# Patient Record
Sex: MALE | Race: OTHER
[De-identification: names, ages, dates, MRNs, and addresses within clinical notes are randomized per-mention and may not be internally consistent; named-entity substitution may affect disease eponyms.]

---

## 2019-01-07 ENCOUNTER — HOSPITAL ENCOUNTER (EMERGENCY)
Dept: HOSPITAL 15 - ER | Age: 54
Discharge: HOME | End: 2019-01-07
Payer: MEDICAID

## 2019-01-07 VITALS — HEIGHT: 68 IN | BODY MASS INDEX: 25.76 KG/M2 | WEIGHT: 170 LBS

## 2019-01-07 VITALS — SYSTOLIC BLOOD PRESSURE: 140 MMHG | DIASTOLIC BLOOD PRESSURE: 81 MMHG

## 2019-01-07 DIAGNOSIS — R51: ICD-10-CM

## 2019-01-07 DIAGNOSIS — R56.9: Primary | ICD-10-CM

## 2019-01-07 DIAGNOSIS — R42: ICD-10-CM

## 2019-01-07 DIAGNOSIS — R53.1: ICD-10-CM

## 2019-01-07 PROCEDURE — 99284 EMERGENCY DEPT VISIT MOD MDM: CPT

## 2019-01-07 PROCEDURE — 96360 HYDRATION IV INFUSION INIT: CPT

## 2019-01-07 PROCEDURE — 71045 X-RAY EXAM CHEST 1 VIEW: CPT

## 2019-01-07 PROCEDURE — 70450 CT HEAD/BRAIN W/O DYE: CPT

## 2019-01-30 ENCOUNTER — HOSPITAL ENCOUNTER (EMERGENCY)
Dept: HOSPITAL 15 - ER | Age: 54
LOS: 1 days | Discharge: HOME | End: 2019-01-31
Payer: MEDICAID

## 2019-01-30 VITALS — BODY MASS INDEX: 23.49 KG/M2 | WEIGHT: 155 LBS | HEIGHT: 68 IN

## 2019-01-30 DIAGNOSIS — Z87.891: ICD-10-CM

## 2019-01-30 DIAGNOSIS — Z79.899: ICD-10-CM

## 2019-01-30 DIAGNOSIS — F10.229: ICD-10-CM

## 2019-01-30 DIAGNOSIS — G40.909: Primary | ICD-10-CM

## 2019-01-30 LAB
ALBUMIN SERPL-MCNC: 4 G/DL (ref 3.4–5)
ALCOHOL, URINE: 492 MG/DL (ref 0–5)
ALP SERPL-CCNC: 80 U/L (ref 45–117)
ALT SERPL-CCNC: 67 U/L (ref 16–61)
AMPHETAMINES UR QL SCN: NEGATIVE
ANION GAP SERPL CALCULATED.3IONS-SCNC: 6 MMOL/L (ref 5–15)
BARBITURATES UR QL SCN: NEGATIVE
BENZODIAZ UR QL SCN: NEGATIVE
BILIRUB SERPL-MCNC: 0.2 MG/DL (ref 0.2–1)
BUN SERPL-MCNC: 9 MG/DL (ref 7–18)
BUN/CREAT SERPL: 12.9
BZE UR QL SCN: NEGATIVE
CALCIUM SERPL-MCNC: 7.7 MG/DL (ref 8.5–10.1)
CANNABINOIDS UR QL SCN: NEGATIVE
CHLORIDE SERPL-SCNC: 110 MMOL/L (ref 98–107)
CO2 SERPL-SCNC: 31 MMOL/L (ref 21–32)
GLUCOSE SERPL-MCNC: 92 MG/DL (ref 74–106)
HCT VFR BLD AUTO: 41 % (ref 41–53)
HGB BLD-MCNC: 13.8 G/DL (ref 13.5–17.5)
MCH RBC QN AUTO: 33.6 PG (ref 28–32)
MCV RBC AUTO: 99.8 FL (ref 80–100)
NRBC BLD QL AUTO: 0 %
OPIATES UR QL SCN: NEGATIVE
PCP UR QL SCN: NEGATIVE
POTASSIUM SERPL-SCNC: 3.5 MMOL/L (ref 3.5–5.1)
PROT SERPL-MCNC: 8.4 G/DL (ref 6.4–8.2)
SODIUM SERPL-SCNC: 147 MMOL/L (ref 136–145)

## 2019-01-30 PROCEDURE — 80053 COMPREHEN METABOLIC PANEL: CPT

## 2019-01-30 PROCEDURE — 80307 DRUG TEST PRSMV CHEM ANLYZR: CPT

## 2019-01-30 PROCEDURE — 80320 DRUG SCREEN QUANTALCOHOLS: CPT

## 2019-01-30 PROCEDURE — 96366 THER/PROPH/DIAG IV INF ADDON: CPT

## 2019-01-30 PROCEDURE — 99284 EMERGENCY DEPT VISIT MOD MDM: CPT

## 2019-01-30 PROCEDURE — 96365 THER/PROPH/DIAG IV INF INIT: CPT

## 2019-01-30 PROCEDURE — 93005 ELECTROCARDIOGRAM TRACING: CPT

## 2019-01-30 PROCEDURE — 96367 TX/PROPH/DG ADDL SEQ IV INF: CPT

## 2019-01-30 PROCEDURE — 84484 ASSAY OF TROPONIN QUANT: CPT

## 2019-01-30 PROCEDURE — 85025 COMPLETE CBC W/AUTO DIFF WBC: CPT

## 2019-01-30 PROCEDURE — 36415 COLL VENOUS BLD VENIPUNCTURE: CPT

## 2019-01-30 PROCEDURE — 80185 ASSAY OF PHENYTOIN TOTAL: CPT

## 2019-01-30 PROCEDURE — 70450 CT HEAD/BRAIN W/O DYE: CPT

## 2019-01-31 VITALS — DIASTOLIC BLOOD PRESSURE: 72 MMHG | SYSTOLIC BLOOD PRESSURE: 110 MMHG

## 2019-06-07 ENCOUNTER — HOSPITAL ENCOUNTER (EMERGENCY)
Dept: HOSPITAL 15 - ER | Age: 54
LOS: 1 days | Discharge: HOME | End: 2019-06-08
Payer: MEDICAID

## 2019-06-07 VITALS — WEIGHT: 170 LBS | BODY MASS INDEX: 24.34 KG/M2 | HEIGHT: 70 IN

## 2019-06-07 DIAGNOSIS — Z87.891: ICD-10-CM
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
## 2019-06-07 DIAGNOSIS — R42: ICD-10-CM

## 2019-06-07 DIAGNOSIS — G40.909: ICD-10-CM

## 2019-06-07 DIAGNOSIS — R51: ICD-10-CM

## 2019-06-07 DIAGNOSIS — F10.129: Primary | ICD-10-CM

## 2019-06-07 LAB
ALBUMIN SERPL-MCNC: 4.2 G/DL (ref 3.4–5)
ALCOHOL, URINE: 479 MG/DL (ref 0–5)
ALP SERPL-CCNC: 79 U/L (ref 45–117)
ALT SERPL-CCNC: 49 U/L (ref 16–61)
AMPHETAMINES UR QL SCN: NEGATIVE
ANION GAP SERPL CALCULATED.3IONS-SCNC: 10 MMOL/L (ref 5–15)
BARBITURATES UR QL SCN: NEGATIVE
BENZODIAZ UR QL SCN: NEGATIVE
BILIRUB SERPL-MCNC: 0.4 MG/DL (ref 0.2–1)
BUN SERPL-MCNC: 11 MG/DL (ref 7–18)
BUN/CREAT SERPL: 15.9
BZE UR QL SCN: NEGATIVE
CALCIUM SERPL-MCNC: 8.3 MG/DL (ref 8.5–10.1)
CANNABINOIDS UR QL SCN: NEGATIVE
CHLORIDE SERPL-SCNC: 104 MMOL/L (ref 98–107)
CO2 SERPL-SCNC: 28 MMOL/L (ref 21–32)
GLUCOSE SERPL-MCNC: 89 MG/DL (ref 74–106)
HCT VFR BLD AUTO: 37.5 % (ref 41–53)
HGB BLD-MCNC: 12.8 G/DL (ref 13.5–17.5)
MCH RBC QN AUTO: 33.4 PG (ref 28–32)
MCV RBC AUTO: 97.6 FL (ref 80–100)
NRBC BLD QL AUTO: 0.1 %
OPIATES UR QL SCN: NEGATIVE
PCP UR QL SCN: NEGATIVE
POTASSIUM SERPL-SCNC: 3.6 MMOL/L (ref 3.5–5.1)
PROT SERPL-MCNC: 8.6 G/DL (ref 6.4–8.2)
SODIUM SERPL-SCNC: 142 MMOL/L (ref 136–145)

## 2019-06-07 PROCEDURE — 80053 COMPREHEN METABOLIC PANEL: CPT

## 2019-06-07 PROCEDURE — 96361 HYDRATE IV INFUSION ADD-ON: CPT

## 2019-06-07 PROCEDURE — 96374 THER/PROPH/DIAG INJ IV PUSH: CPT

## 2019-06-07 PROCEDURE — 81001 URINALYSIS AUTO W/SCOPE: CPT

## 2019-06-07 PROCEDURE — 99284 EMERGENCY DEPT VISIT MOD MDM: CPT

## 2019-06-07 PROCEDURE — 70450 CT HEAD/BRAIN W/O DYE: CPT

## 2019-06-07 PROCEDURE — 36415 COLL VENOUS BLD VENIPUNCTURE: CPT

## 2019-06-07 PROCEDURE — 80307 DRUG TEST PRSMV CHEM ANLYZR: CPT

## 2019-06-07 PROCEDURE — 82542 COL CHROMOTOGRAPHY QUAL/QUAN: CPT

## 2019-06-07 PROCEDURE — 80320 DRUG SCREEN QUANTALCOHOLS: CPT

## 2019-06-07 PROCEDURE — 85025 COMPLETE CBC W/AUTO DIFF WBC: CPT

## 2019-06-08 VITALS — DIASTOLIC BLOOD PRESSURE: 74 MMHG | SYSTOLIC BLOOD PRESSURE: 119 MMHG

## 2019-07-04 ENCOUNTER — HOSPITAL ENCOUNTER (INPATIENT)
Dept: HOSPITAL 15 - ER | Age: 54
LOS: 2 days | Discharge: HOME | DRG: 53 | End: 2019-07-06
Attending: INTERNAL MEDICINE | Admitting: NURSE PRACTITIONER
Payer: MEDICAID

## 2019-07-04 VITALS — BODY MASS INDEX: 21.87 KG/M2 | HEIGHT: 67 IN | WEIGHT: 139.33 LBS

## 2019-07-04 VITALS — SYSTOLIC BLOOD PRESSURE: 139 MMHG | DIASTOLIC BLOOD PRESSURE: 90 MMHG

## 2019-07-04 DIAGNOSIS — Z80.0: ICD-10-CM

## 2019-07-04 DIAGNOSIS — G40.509: Primary | ICD-10-CM

## 2019-07-04 DIAGNOSIS — Z91.14: ICD-10-CM

## 2019-07-04 DIAGNOSIS — E87.1: ICD-10-CM
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
## 2019-07-04 DIAGNOSIS — Z87.891: ICD-10-CM

## 2019-07-04 DIAGNOSIS — E87.6: ICD-10-CM

## 2019-07-04 DIAGNOSIS — Z82.49: ICD-10-CM

## 2019-07-04 DIAGNOSIS — F10.230: ICD-10-CM

## 2019-07-04 DIAGNOSIS — L03.031: ICD-10-CM

## 2019-07-04 DIAGNOSIS — Z59.0: ICD-10-CM

## 2019-07-04 DIAGNOSIS — Z79.899: ICD-10-CM

## 2019-07-04 LAB
ALBUMIN SERPL-MCNC: 4.3 G/DL (ref 3.4–5)
ALP SERPL-CCNC: 74 U/L (ref 45–117)
ALT SERPL-CCNC: 60 U/L (ref 16–61)
ANION GAP SERPL CALCULATED.3IONS-SCNC: 13 MMOL/L (ref 5–15)
APTT PPP: 25.1 SEC (ref 23.64–32.05)
BILIRUB SERPL-MCNC: 1 MG/DL (ref 0.2–1)
BUN SERPL-MCNC: 10 MG/DL (ref 7–18)
BUN/CREAT SERPL: 13.5
CALCIUM SERPL-MCNC: 8.6 MG/DL (ref 8.5–10.1)
CHLORIDE SERPL-SCNC: 97 MMOL/L (ref 98–107)
CO2 SERPL-SCNC: 27 MMOL/L (ref 21–32)
GLUCOSE SERPL-MCNC: 101 MG/DL (ref 74–106)
HCT VFR BLD AUTO: 34.8 % (ref 41–53)
HGB BLD-MCNC: 11.6 G/DL (ref 13.5–17.5)
INR PPP: 1.02 (ref 0.9–1.15)
MCH RBC QN AUTO: 33 PG (ref 28–32)
MCV RBC AUTO: 99 FL (ref 80–100)
NRBC BLD QL AUTO: 0 %
POTASSIUM SERPL-SCNC: 3.3 MMOL/L (ref 3.5–5.1)
PROT SERPL-MCNC: 8.5 G/DL (ref 6.4–8.2)
SODIUM SERPL-SCNC: 137 MMOL/L (ref 136–145)

## 2019-07-04 PROCEDURE — 80320 DRUG SCREEN QUANTALCOHOLS: CPT

## 2019-07-04 PROCEDURE — 83735 ASSAY OF MAGNESIUM: CPT

## 2019-07-04 PROCEDURE — 85730 THROMBOPLASTIN TIME PARTIAL: CPT

## 2019-07-04 PROCEDURE — 80048 BASIC METABOLIC PNL TOTAL CA: CPT

## 2019-07-04 PROCEDURE — 87081 CULTURE SCREEN ONLY: CPT

## 2019-07-04 PROCEDURE — 96361 HYDRATE IV INFUSION ADD-ON: CPT

## 2019-07-04 PROCEDURE — 36415 COLL VENOUS BLD VENIPUNCTURE: CPT

## 2019-07-04 PROCEDURE — 71045 X-RAY EXAM CHEST 1 VIEW: CPT

## 2019-07-04 PROCEDURE — 85610 PROTHROMBIN TIME: CPT

## 2019-07-04 PROCEDURE — 93005 ELECTROCARDIOGRAM TRACING: CPT

## 2019-07-04 PROCEDURE — 96365 THER/PROPH/DIAG IV INF INIT: CPT

## 2019-07-04 PROCEDURE — 70450 CT HEAD/BRAIN W/O DYE: CPT

## 2019-07-04 PROCEDURE — 96375 TX/PRO/DX INJ NEW DRUG ADDON: CPT

## 2019-07-04 PROCEDURE — 84484 ASSAY OF TROPONIN QUANT: CPT

## 2019-07-04 PROCEDURE — 85025 COMPLETE CBC W/AUTO DIFF WBC: CPT

## 2019-07-04 PROCEDURE — 80053 COMPREHEN METABOLIC PANEL: CPT

## 2019-07-04 PROCEDURE — 84132 ASSAY OF SERUM POTASSIUM: CPT

## 2019-07-04 PROCEDURE — 81001 URINALYSIS AUTO W/SCOPE: CPT

## 2019-07-04 RX ADMIN — LEVOFLOXACIN SCH MG: 500 TABLET, FILM COATED ORAL at 18:21

## 2019-07-04 RX ADMIN — DEXTROSE SCH MLS/HR: 50 INJECTION, SOLUTION INTRAVENOUS at 19:00

## 2019-07-04 RX ADMIN — DOCUSATE SODIUM SCH MG: 100 CAPSULE, LIQUID FILLED ORAL at 21:34

## 2019-07-04 RX ADMIN — NYSTATIN SCH APPLIC: 100000 CREAM TOPICAL at 21:34

## 2019-07-04 RX ADMIN — DEXTROSE SCH MLS/HR: 50 INJECTION, SOLUTION INTRAVENOUS at 22:22

## 2019-07-04 SDOH — ECONOMIC STABILITY - HOUSING INSECURITY: HOMELESSNESS: Z59.0

## 2019-07-04 NOTE — NUR
Telemetry admit from ER

KEARA DEL RIO admitted to Telemetry unit after SBAR received. Patient oriented to Elle Moreno RN primary RN, unit, room, bed, and unit policies regarding patient care and 
visiting hours. Patient now on continuous telemetry monitoring, tele box #9. Patient weighed 
by bed scale and encouraged to call if they need something. All questions and concerns 
addressed, patient verbalized understanding. 

Note: Patient temp 100.4, vomiting and nausea upon arrival. Cooling measures applied, will 
medicate as ordered. Iv line flushed, patent, no redness.

## 2019-07-04 NOTE — NUR
Respiratory note:

ASSESSMENT FOR PRN MED NEB TX. HR 84, SPO2 93% ON ROOM AIR, RR 18, BS DIMINISHED. PT 
PRESENTING NO RESPIRATORY DISTRESS AT THIS TIME, PT AWARE TO HAVE RN PAGE RT IF MED ENB TX 
IS NEEDED, WILL CONTINUE TO MONITOR.

## 2019-07-05 VITALS — SYSTOLIC BLOOD PRESSURE: 120 MMHG | DIASTOLIC BLOOD PRESSURE: 68 MMHG

## 2019-07-05 VITALS — DIASTOLIC BLOOD PRESSURE: 69 MMHG | SYSTOLIC BLOOD PRESSURE: 129 MMHG

## 2019-07-05 VITALS — SYSTOLIC BLOOD PRESSURE: 122 MMHG | DIASTOLIC BLOOD PRESSURE: 75 MMHG

## 2019-07-05 VITALS — SYSTOLIC BLOOD PRESSURE: 115 MMHG | DIASTOLIC BLOOD PRESSURE: 77 MMHG

## 2019-07-05 VITALS — DIASTOLIC BLOOD PRESSURE: 82 MMHG | SYSTOLIC BLOOD PRESSURE: 127 MMHG

## 2019-07-05 LAB
ANION GAP SERPL CALCULATED.3IONS-SCNC: 8 MMOL/L (ref 5–15)
BUN SERPL-MCNC: 8 MG/DL (ref 7–18)
BUN/CREAT SERPL: 12.9
CALCIUM SERPL-MCNC: 8.5 MG/DL (ref 8.5–10.1)
CHLORIDE SERPL-SCNC: 96 MMOL/L (ref 98–107)
CO2 SERPL-SCNC: 30 MMOL/L (ref 21–32)
GLUCOSE SERPL-MCNC: 84 MG/DL (ref 74–106)
HCT VFR BLD AUTO: 34.6 % (ref 41–53)
HGB BLD-MCNC: 11.6 G/DL (ref 13.5–17.5)
MCH RBC QN AUTO: 33.2 PG (ref 28–32)
MCV RBC AUTO: 98.7 FL (ref 80–100)
NRBC BLD QL AUTO: 0.1 %
POTASSIUM SERPL-SCNC: 3.2 MMOL/L (ref 3.5–5.1)
SODIUM SERPL-SCNC: 134 MMOL/L (ref 136–145)

## 2019-07-05 RX ADMIN — DOCUSATE SODIUM SCH MG: 100 CAPSULE, LIQUID FILLED ORAL at 10:00

## 2019-07-05 RX ADMIN — LEVOFLOXACIN SCH MG: 500 TABLET, FILM COATED ORAL at 10:58

## 2019-07-05 RX ADMIN — DEXTROSE SCH MLS/HR: 50 INJECTION, SOLUTION INTRAVENOUS at 10:58

## 2019-07-05 RX ADMIN — DEXTROSE SCH MLS/HR: 50 INJECTION, SOLUTION INTRAVENOUS at 12:30

## 2019-07-05 RX ADMIN — DOCUSATE SODIUM SCH MG: 100 CAPSULE, LIQUID FILLED ORAL at 21:32

## 2019-07-05 RX ADMIN — POTASSIUM CHLORIDE SCH MEQ: 1500 TABLET, EXTENDED RELEASE ORAL at 21:32

## 2019-07-05 RX ADMIN — NYSTATIN SCH APPLIC: 100000 CREAM TOPICAL at 21:33

## 2019-07-05 RX ADMIN — LEVETIRACETAM SCH MG: 500 TABLET, FILM COATED ORAL at 21:32

## 2019-07-05 RX ADMIN — FAMOTIDINE SCH MG: 20 TABLET, FILM COATED ORAL at 10:58

## 2019-07-05 RX ADMIN — NYSTATIN SCH APPLIC: 100000 CREAM TOPICAL at 11:10

## 2019-07-05 NOTE — NUR
Received referral to see pt as he is homeless. Pt is alert and oriented times 3. Pt states 
he lives in [place]. He has a sister up here in the [facility] but cannot live there. Pt 
states he cannot get his medicine for his seizures so he has been unable to get them under 
control. Pt's last 5 admission to the hospital have been for non compliance. Pt has medi-daniel 
but states he has been unable to get his prescription.  explained that all he 
has to do is show his medi-daniel card. Pt has no income and did not file the paperwork to get 
food stamps. Pt will not be able to go into the [facility] shelter as he has no 
ID. Gave pt resources.

## 2019-07-05 NOTE — NUR
Open Shift Note

Received report on patient, awake and lying in bed. Patient's linens soiled. Helped patient 
to ambulate to restroom, had bowel movement and stated it was diarrhea. Bed linens changed 
and patient ambulated with assistance back to bed. Discussed POC with patient. Bed in lowest 
locked position, side rails up x2 and call light within reach. Will continue to monitor.

## 2019-07-05 NOTE — NUR
End of Shift

Patient lying in bed, showing no signs of distress at this time. Bed in lowest locked 
position, side rails up x2 and call light within reach. Endorsed care to [facility] nurse Johnson.
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
## 2019-07-05 NOTE — NUR
WOUND CARE NOTE: PATIENT ADMITTED TO [facility] WITH DIAGNOSIS OF BREAKTHROUGH SEIZURES. CURRENT 
RUDY SCORE IS 21. PATIENT IS ABLE TO AMBULATE, SELF TURN/REPOSITION SELF WHEN IN BED.  
PATIENT NOTED UPON ADMIT TO HAVE SKIN INTEGRITY ISSUES CONSISTING OF AN INTACT CALLOUSED 
CORN TO THE LEFT # 2 TOE, AN INTACT RASH TO THE GROIN, AND SKIN PATCHES TO BILATERAL LOWER 
EXTREMITIES.  ALL SKIN INTEGRITY AREAS PHOTOGRAPHED UPON ADMIT BY BEDSIDE NURSE FOR 
REFERENCE. ALL AREAS LEFT OPEN TO AIR.  HOSPITALIST HAS ORDERED FOR ANTIFUNGAL CREAM FOR HIS 
SKIN RASH/PATCH AREAS, NO DRESSING REQUIRED FOR CORN ON TOE.  NO NEED FOR WOUND CARE TEAM AT 
THIS TIME.

## 2019-07-06 VITALS — SYSTOLIC BLOOD PRESSURE: 118 MMHG | DIASTOLIC BLOOD PRESSURE: 78 MMHG

## 2019-07-06 VITALS — DIASTOLIC BLOOD PRESSURE: 61 MMHG | SYSTOLIC BLOOD PRESSURE: 102 MMHG

## 2019-07-06 VITALS — SYSTOLIC BLOOD PRESSURE: 118 MMHG | DIASTOLIC BLOOD PRESSURE: 76 MMHG

## 2019-07-06 RX ADMIN — FAMOTIDINE SCH MG: 20 TABLET, FILM COATED ORAL at 09:45

## 2019-07-06 RX ADMIN — LEVOFLOXACIN SCH MG: 500 TABLET, FILM COATED ORAL at 09:45

## 2019-07-06 RX ADMIN — DOCUSATE SODIUM SCH MG: 100 CAPSULE, LIQUID FILLED ORAL at 09:47

## 2019-07-06 RX ADMIN — POTASSIUM CHLORIDE SCH MEQ: 1500 TABLET, EXTENDED RELEASE ORAL at 09:45

## 2019-07-06 RX ADMIN — NYSTATIN SCH APPLIC: 100000 CREAM TOPICAL at 09:45

## 2019-07-06 RX ADMIN — DEXTROSE SCH MLS/HR: 50 INJECTION, SOLUTION INTRAVENOUS at 12:00

## 2019-07-06 RX ADMIN — LEVETIRACETAM SCH MG: 500 TABLET, FILM COATED ORAL at 09:45

## 2019-07-06 NOTE — NUR
Discharged

Discharge instructions given as ordered. Patient has no primary care provider but was given 
information for Mihaela Vinson to help set up provider. All questions and concerns addressed. 
Patient verbalized understanding. Prescriptions given to patient. IV removed with catheter 
intact, pressure dressing applied. Patient given bus pass and stated he has a place he can 
go to stay. Telemetry unit returned to [facility]. Patient taken to lobby via wheelchair with all 
personal belongings, accompanied by staff. No distress noted at time of departure.

## 2019-07-06 NOTE — NUR
Patient Refused Discharge Photos

Patient stated "I just put my pants on and it was really difficult". Patient refused for 
photos to be taken of legs and foot.

## 2019-07-06 NOTE — NUR
SUSAN called:

SUSAN called RN regarding patient's HR being in the 140's. RN checked on patient and patient 
was noted to be in the restroom having a BM. Patient made a large mess on the bathroom floor 
and was attempting to clean up the mess. RN helped patient back into bed and HR was noted to 
return to normal and in the 90's. Patient comfortable with no complaints of pain or 
distress.

## 2019-07-06 NOTE — NUR
Open Shift Note

Received report on patient, asleep in bed with covers over head. Patient easily awoken, 
shows no signs of distress at this time. Patient states they feel as if one of their 
medications is causing them to have multiple bowel movements and would like to bring it up 
to the doctor today. Discussed POC with patient. Bed in lowest locked position, side rails 
up x2 and call light within reach. Will continue to monitor.

## 2019-10-09 ENCOUNTER — HOSPITAL ENCOUNTER (INPATIENT)
Dept: HOSPITAL 15 - ER | Age: 54
LOS: 2 days | Discharge: HOME | DRG: 720 | End: 2019-10-11
Attending: INTERNAL MEDICINE | Admitting: INTERNAL MEDICINE
Payer: MEDICAID

## 2019-10-09 VITALS — HEIGHT: 63 IN | BODY MASS INDEX: 27.62 KG/M2 | WEIGHT: 155.87 LBS

## 2019-10-09 DIAGNOSIS — Z80.0: ICD-10-CM

## 2019-10-09 DIAGNOSIS — S80.822A: ICD-10-CM

## 2019-10-09 DIAGNOSIS — F10.229: ICD-10-CM

## 2019-10-09 DIAGNOSIS — F19.10: ICD-10-CM
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
## 2019-10-09 DIAGNOSIS — L03.115: ICD-10-CM

## 2019-10-09 DIAGNOSIS — R73.9: ICD-10-CM

## 2019-10-09 DIAGNOSIS — L03.116: ICD-10-CM

## 2019-10-09 DIAGNOSIS — E87.6: ICD-10-CM

## 2019-10-09 DIAGNOSIS — Z59.0: ICD-10-CM
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
## 2019-10-09 DIAGNOSIS — S80.821A: ICD-10-CM

## 2019-10-09 DIAGNOSIS — R56.9: ICD-10-CM

## 2019-10-09 DIAGNOSIS — A41.9: Primary | ICD-10-CM

## 2019-10-09 DIAGNOSIS — Z82.49: ICD-10-CM

## 2019-10-09 DIAGNOSIS — Z91.19: ICD-10-CM
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
## 2019-10-09 LAB
ALBUMIN SERPL-MCNC: 3.5 G/DL (ref 3.4–5)
ALP SERPL-CCNC: 76 U/L (ref 45–117)
ALT SERPL-CCNC: 70 U/L (ref 16–61)
ANION GAP SERPL CALCULATED.3IONS-SCNC: 9 MMOL/L (ref 5–15)
BILIRUB SERPL-MCNC: 0.3 MG/DL (ref 0.2–1)
BUN SERPL-MCNC: 7 MG/DL (ref 7–18)
BUN/CREAT SERPL: 12.1
CALCIUM SERPL-MCNC: 8.2 MG/DL (ref 8.5–10.1)
CHLORIDE SERPL-SCNC: 107 MMOL/L (ref 98–107)
CO2 SERPL-SCNC: 27 MMOL/L (ref 21–32)
GLUCOSE SERPL-MCNC: 140 MG/DL (ref 74–106)
HCT VFR BLD AUTO: 34.2 % (ref 41–53)
HGB BLD-MCNC: 11.4 G/DL (ref 13.5–17.5)
LACTATE PLASV-SCNC: 3.4 MMOL/L (ref 0.4–2)
MCH RBC QN AUTO: 33 PG (ref 28–32)
MCV RBC AUTO: 98.3 FL (ref 80–100)
NRBC BLD QL AUTO: 0 %
POTASSIUM SERPL-SCNC: 3.3 MMOL/L (ref 3.5–5.1)
PROT SERPL-MCNC: 7.9 G/DL (ref 6.4–8.2)
SODIUM SERPL-SCNC: 143 MMOL/L (ref 136–145)

## 2019-10-09 PROCEDURE — 71250 CT THORAX DX C-: CPT

## 2019-10-09 PROCEDURE — 96372 THER/PROPH/DIAG INJ SC/IM: CPT

## 2019-10-09 PROCEDURE — 85025 COMPLETE CBC W/AUTO DIFF WBC: CPT

## 2019-10-09 PROCEDURE — 87086 URINE CULTURE/COLONY COUNT: CPT

## 2019-10-09 PROCEDURE — 83605 ASSAY OF LACTIC ACID: CPT

## 2019-10-09 PROCEDURE — 82962 GLUCOSE BLOOD TEST: CPT

## 2019-10-09 PROCEDURE — 36600 WITHDRAWAL OF ARTERIAL BLOOD: CPT

## 2019-10-09 PROCEDURE — 94761 N-INVAS EAR/PLS OXIMETRY MLT: CPT

## 2019-10-09 PROCEDURE — 85652 RBC SED RATE AUTOMATED: CPT

## 2019-10-09 PROCEDURE — 84484 ASSAY OF TROPONIN QUANT: CPT

## 2019-10-09 PROCEDURE — 73590 X-RAY EXAM OF LOWER LEG: CPT

## 2019-10-09 PROCEDURE — 80053 COMPREHEN METABOLIC PANEL: CPT

## 2019-10-09 PROCEDURE — 87040 BLOOD CULTURE FOR BACTERIA: CPT

## 2019-10-09 PROCEDURE — 73620 X-RAY EXAM OF FOOT: CPT

## 2019-10-09 PROCEDURE — 96375 TX/PRO/DX INJ NEW DRUG ADDON: CPT

## 2019-10-09 PROCEDURE — 36415 COLL VENOUS BLD VENIPUNCTURE: CPT

## 2019-10-09 PROCEDURE — 83880 ASSAY OF NATRIURETIC PEPTIDE: CPT

## 2019-10-09 PROCEDURE — 85730 THROMBOPLASTIN TIME PARTIAL: CPT

## 2019-10-09 PROCEDURE — 83036 HEMOGLOBIN GLYCOSYLATED A1C: CPT

## 2019-10-09 PROCEDURE — 85610 PROTHROMBIN TIME: CPT

## 2019-10-09 PROCEDURE — 71045 X-RAY EXAM CHEST 1 VIEW: CPT

## 2019-10-09 PROCEDURE — 96365 THER/PROPH/DIAG IV INF INIT: CPT

## 2019-10-09 PROCEDURE — 81001 URINALYSIS AUTO W/SCOPE: CPT

## 2019-10-09 PROCEDURE — 80320 DRUG SCREEN QUANTALCOHOLS: CPT

## 2019-10-09 PROCEDURE — 82805 BLOOD GASES W/O2 SATURATION: CPT

## 2019-10-09 PROCEDURE — 85379 FIBRIN DEGRADATION QUANT: CPT

## 2019-10-09 SDOH — ECONOMIC STABILITY - HOUSING INSECURITY: HOMELESSNESS: Z59.0

## 2019-10-10 VITALS — SYSTOLIC BLOOD PRESSURE: 127 MMHG | DIASTOLIC BLOOD PRESSURE: 73 MMHG

## 2019-10-10 VITALS — DIASTOLIC BLOOD PRESSURE: 77 MMHG | SYSTOLIC BLOOD PRESSURE: 126 MMHG

## 2019-10-10 LAB
ALBUMIN SERPL-MCNC: 3.3 G/DL (ref 3.4–5)
ALP SERPL-CCNC: 72 U/L (ref 45–117)
ALT SERPL-CCNC: 61 U/L (ref 16–61)
ANION GAP SERPL CALCULATED.3IONS-SCNC: 9 MMOL/L (ref 5–15)
APTT PPP: 24.8 SEC (ref 23.64–32.05)
BILIRUB SERPL-MCNC: 0.3 MG/DL (ref 0.2–1)
BUN SERPL-MCNC: 8 MG/DL (ref 7–18)
BUN/CREAT SERPL: 13.3
CALCIUM SERPL-MCNC: 7.9 MG/DL (ref 8.5–10.1)
CHLORIDE SERPL-SCNC: 105 MMOL/L (ref 98–107)
CO2 SERPL-SCNC: 28 MMOL/L (ref 21–32)
GLUCOSE SERPL-MCNC: 96 MG/DL (ref 74–106)
INR PPP: 0.95 (ref 0.9–1.15)
POTASSIUM SERPL-SCNC: 3.6 MMOL/L (ref 3.5–5.1)
PROT SERPL-MCNC: 7.7 G/DL (ref 6.4–8.2)
SODIUM SERPL-SCNC: 142 MMOL/L (ref 136–145)

## 2019-10-10 RX ADMIN — Medication SCH STRIP: at 17:22

## 2019-10-10 RX ADMIN — ACETAMINOPHEN PRN MG: 500 TABLET ORAL at 13:56

## 2019-10-10 RX ADMIN — Medication SCH STRIP: at 12:42

## 2019-10-10 RX ADMIN — LEVETIRACETAM SCH MG: 500 TABLET, FILM COATED ORAL at 20:49

## 2019-10-10 RX ADMIN — PIPERACILLIN SODIUM AND TAZOBACTAM SODIUM SCH MLS/HR: .375; 3 INJECTION, POWDER, LYOPHILIZED, FOR SOLUTION INTRAVENOUS at 04:27

## 2019-10-10 RX ADMIN — SODIUM CHLORIDE SCH MLS/HR: 0.9 INJECTION, SOLUTION INTRAVENOUS at 10:55

## 2019-10-10 RX ADMIN — HUMAN INSULIN SCH UNITS: 100 INJECTION, SOLUTION SUBCUTANEOUS at 11:30

## 2019-10-10 RX ADMIN — LEVETIRACETAM SCH MG: 500 TABLET, FILM COATED ORAL at 19:15

## 2019-10-10 RX ADMIN — ACETAMINOPHEN PRN MG: 500 TABLET ORAL at 21:36

## 2019-10-10 RX ADMIN — PIPERACILLIN SODIUM AND TAZOBACTAM SODIUM SCH MLS/HR: .375; 3 INJECTION, POWDER, LYOPHILIZED, FOR SOLUTION INTRAVENOUS at 12:43

## 2019-10-10 RX ADMIN — ENOXAPARIN SODIUM SCH MG: 40 INJECTION SUBCUTANEOUS at 10:55

## 2019-10-10 RX ADMIN — HUMAN INSULIN SCH UNITS: 100 INJECTION, SOLUTION SUBCUTANEOUS at 21:02

## 2019-10-10 RX ADMIN — DEXTROSE SCH MLS/HR: 5 SOLUTION INTRAVENOUS at 20:58

## 2019-10-10 RX ADMIN — Medication SCH STRIP: at 21:02

## 2019-10-10 RX ADMIN — SODIUM CHLORIDE SCH MLS/HR: 0.9 INJECTION, SOLUTION INTRAVENOUS at 20:38

## 2019-10-10 RX ADMIN — PIPERACILLIN SODIUM AND TAZOBACTAM SODIUM SCH MLS/HR: .375; 3 INJECTION, POWDER, LYOPHILIZED, FOR SOLUTION INTRAVENOUS at 17:22

## 2019-10-10 RX ADMIN — HUMAN INSULIN SCH UNITS: 100 INJECTION, SOLUTION SUBCUTANEOUS at 17:00

## 2019-10-10 NOTE — NUR
Opening Shift Note

Assumed care of patient, awake and alert, oriented x 4, follows direction, clear speech. On 
oxygen at 2L via NC, even and unlabored respirations, no S/S of distress. Dressing to 
bilateral legs clean, dry and intact. Patient turns independently in bed. Bed low locked 
position with side rails up x 2 and call light within reach, bed alarm on.  Instructed on 
POC and to call for assist PRN, will continue to monitor for changes Q1hr and PRN.

## 2019-10-10 NOTE — NUR
PT ADMITTED TO FLOOR VIA GURNEY FROM E.R.. PT REPORTS 8/10 PAIN IN BILATERAL LOWER 
EXTREMITIES. LEGS BANDAGED IN E.R. ER NURSE REPORTS PHOTOS ALREADY TAKEN OF WOUNDS. PT 
ORIENTED TO CALL LIGHT AND UNIT. BED IN LOWEST LOCKED POSITION, SIDE RAILS UP X2. VITALS: BP 
130/81, HR 97, RR 20, 02 95, T 99.2. WILL CONTINUE TO MONITOR.

## 2019-10-10 NOTE — NUR
WOUND CARE

PT RIGHT AND LEFT LEG WOUNDS CLEANSED WITH WOUND CLEANSER, PATTED DRY WITH 4X4 GAUZE. 
THERAHONEY APPLIED, AND COVERED WITH OPTIFOAM AND KERLIX.

## 2019-10-10 NOTE — NUR
SEIZURE

UNIT SECRETARY REPORTS PT USED CALL LIGHT TO USE COMMODE. ENTERED ROOM, PT LAYING SIDEWAYS 
ON BED, FEET TOUCHING FLOOR. PT SHAKING AND NOT RESPONDING TO QUESTIONS OR SHAKING. PT 
URINATED ON CLOTHES AND BED. CALLED FOR HELP. 02 PUT ON PATIENT AT 2L NC, PATIENT LEGS 
PLACED BACK ON BED. [id number], , 02 98, 100.3, RR 18. PT BECAME RESPONSIVE TO 
QUESTIONS WITHIN A FEW MINUTES BUT REMAINED CONFUSED, PT KNEW NAME AND , BUT NOT 
WHEREABOUTS OR PRESIDENT. NO IV ATIVAN AVAILABLE. ATTEMPTED TO GIVE PT PRN ANXIETY ATIVAN 
AND PT SPIT IT OUT. CHANGED PT BEDDING AND CLEANED PATIENT. PT BECAME MORE ALERT. ATTEMPTED 
TO GIVE ATIVAN AGAIN, PT ACCEPTED. ORIGINAL BED ALARM WAS BROKEN AND KEPT SOUNDING ONCE BED 
WAS TURNED ON, BED REPLACED WITH ALARM ON. PAGED HOSPITALIST, DR ARAGON CALLED BACK. NOTIFIED 
MD PT HAD A SEIZURE AND WAS CONFUSED. REQUESTED ATIVAN IV. MD AWARE AND REPORTS HE WILL PUT 
IN ORDERS.

-------------------------------------------------------------------------------

Addendum: 10/10/19 at 1928 by ALBERTO CALDERÓN RN

-------------------------------------------------------------------------------

SIDE RAILS PADDED, BED IN LOWEST LOCKED POSITION, SIDE RAILS UP X2, CALL LIGHT IN REACH.

## 2019-10-11 VITALS — SYSTOLIC BLOOD PRESSURE: 128 MMHG | DIASTOLIC BLOOD PRESSURE: 68 MMHG

## 2019-10-11 VITALS — SYSTOLIC BLOOD PRESSURE: 122 MMHG | DIASTOLIC BLOOD PRESSURE: 57 MMHG

## 2019-10-11 VITALS — DIASTOLIC BLOOD PRESSURE: 66 MMHG | SYSTOLIC BLOOD PRESSURE: 113 MMHG

## 2019-10-11 VITALS — DIASTOLIC BLOOD PRESSURE: 57 MMHG | SYSTOLIC BLOOD PRESSURE: 122 MMHG

## 2019-10-11 VITALS — DIASTOLIC BLOOD PRESSURE: 69 MMHG | SYSTOLIC BLOOD PRESSURE: 114 MMHG

## 2019-10-11 LAB
ALBUMIN SERPL-MCNC: 2.9 G/DL (ref 3.4–5)
ALP SERPL-CCNC: 67 U/L (ref 45–117)
ALT SERPL-CCNC: 43 U/L (ref 16–61)
ANION GAP SERPL CALCULATED.3IONS-SCNC: 7 MMOL/L (ref 5–15)
BILIRUB SERPL-MCNC: 1.1 MG/DL (ref 0.2–1)
BUN SERPL-MCNC: 9 MG/DL (ref 7–18)
BUN/CREAT SERPL: 16.4
CALCIUM SERPL-MCNC: 8.2 MG/DL (ref 8.5–10.1)
CHLORIDE SERPL-SCNC: 103 MMOL/L (ref 98–107)
CO2 SERPL-SCNC: 28 MMOL/L (ref 21–32)
GLUCOSE SERPL-MCNC: 89 MG/DL (ref 74–106)
HCT VFR BLD AUTO: 32.9 % (ref 41–53)
HGB BLD-MCNC: 11.1 G/DL (ref 13.5–17.5)
MCH RBC QN AUTO: 33.3 PG (ref 28–32)
MCV RBC AUTO: 98.5 FL (ref 80–100)
NRBC BLD QL AUTO: 0 %
POTASSIUM SERPL-SCNC: 4.1 MMOL/L (ref 3.5–5.1)
PROT SERPL-MCNC: 7 G/DL (ref 6.4–8.2)
SODIUM SERPL-SCNC: 138 MMOL/L (ref 136–145)

## 2019-10-11 RX ADMIN — SODIUM CHLORIDE SCH MLS/HR: 0.9 INJECTION, SOLUTION INTRAVENOUS at 06:28

## 2019-10-11 RX ADMIN — ACETAMINOPHEN PRN MG: 500 TABLET ORAL at 13:52

## 2019-10-11 RX ADMIN — Medication SCH STRIP: at 06:30

## 2019-10-11 RX ADMIN — PIPERACILLIN SODIUM AND TAZOBACTAM SODIUM SCH MLS/HR: .375; 3 INJECTION, POWDER, LYOPHILIZED, FOR SOLUTION INTRAVENOUS at 00:42

## 2019-10-11 RX ADMIN — ENOXAPARIN SODIUM SCH MG: 40 INJECTION SUBCUTANEOUS at 09:55

## 2019-10-11 RX ADMIN — PIPERACILLIN SODIUM AND TAZOBACTAM SODIUM SCH MLS/HR: .375; 3 INJECTION, POWDER, LYOPHILIZED, FOR SOLUTION INTRAVENOUS at 18:00

## 2019-10-11 RX ADMIN — PIPERACILLIN SODIUM AND TAZOBACTAM SODIUM SCH MLS/HR: .375; 3 INJECTION, POWDER, LYOPHILIZED, FOR SOLUTION INTRAVENOUS at 12:10

## 2019-10-11 RX ADMIN — Medication SCH STRIP: at 12:09

## 2019-10-11 RX ADMIN — Medication SCH STRIP: at 17:00

## 2019-10-11 RX ADMIN — HUMAN INSULIN SCH UNITS: 100 INJECTION, SOLUTION SUBCUTANEOUS at 11:30

## 2019-10-11 RX ADMIN — DEXTROSE SCH MLS/HR: 5 SOLUTION INTRAVENOUS at 09:54

## 2019-10-11 RX ADMIN — HUMAN INSULIN SCH UNITS: 100 INJECTION, SOLUTION SUBCUTANEOUS at 17:00

## 2019-10-11 RX ADMIN — SODIUM CHLORIDE SCH MLS/HR: 0.9 INJECTION, SOLUTION INTRAVENOUS at 18:12

## 2019-10-11 RX ADMIN — LEVETIRACETAM SCH MG: 500 TABLET, FILM COATED ORAL at 09:55

## 2019-10-11 RX ADMIN — HUMAN INSULIN SCH UNITS: 100 INJECTION, SOLUTION SUBCUTANEOUS at 06:30

## 2019-10-11 RX ADMIN — PIPERACILLIN SODIUM AND TAZOBACTAM SODIUM SCH MLS/HR: .375; 3 INJECTION, POWDER, LYOPHILIZED, FOR SOLUTION INTRAVENOUS at 06:29

## 2019-10-11 NOTE — NUR
DR BLUNT REPORTS HE SPOKE WITH PATIENT. PT REPORTED TO MD HE DOES NOT WANT TO GO TO SNF 
BECAUSE HE WANTS TO DRINK AND IT WON'T BE ALLOWED THERE. MD REPORTS PT SAYS HE WOULD LIKE A 
[facility]ER TO GO TO [facility] AND DR BLUNT IS CALLING IN PRESCRIPTIONS TO PATIENT'S 
PHARMACY.

## 2019-10-11 NOTE — NUR
Closing Note

patient resting in bed with even and unlabored respirations, no s/s of distress. Bed low 
locked position with padded side rails up x 2 and call light within reach, bed alarm on. 
Seizure precautions in place. Endorsed care to day shift RN.

## 2019-10-11 NOTE — NUR
CALLED DR BLUNT TO CLARIFY PRESCRIPTIONS, MD REPORTS HE SENT PRESCRIPTION TO [facility] 
PHARMACY. CALLED [facility], THEY REPORT THE STUDENTS BROUGHT THEM UP TO THE PATIENT AND 
THE MEDICATIONS ARE AT BEDSIDE.

## 2019-10-11 NOTE — NUR
Discharge instructions given as ordered. Encourage to follow up with PMD as instructed. All 
questions and concerns addressed. Patient verbalized understanding.  Medication 
reconciliation form completed and copy given to patient. IV removed with catheter intact, 
pressure dressing applied.  Telemetry unit returned to ICU. Patient taken to taxi via 
wheelchair with all personal belongings, accompanied by staff. No distress noted at time of 
departure.

## 2019-10-11 NOTE — NUR
assessment

Patient is a 54 year old male who is alert and oriented. Prior to admission patient was 
homeless for the past year per patient. Patient sleeps on [address] in [place]. Patient 
has a rollator for his personal use. Patient informed me he is independent with his ADL's. 
Patient has no PCP. Mihaela Holt to see patient for PCP. Sue Caro SW 1 has seen patient for 
homeless resources and information. Patient has also been provided with [facility] shelter address and phone number Patient has been provided with homeless resources 
for government agencies such as [employer] [address], department of social service, [employer], ad 
[employer], Also part of the resource has information on various homeless shelters, 
behavioral health and crisis center, various churches, and laundry facilities. Patient has 
been provided with clothing appropriate for the season. Patient wants to be discharged back 
to the street. Patient has signed homeless waiver with Sue ELLIS. Patient verbalized 
understanding and agreed to discharge plan back homeless. 

-------------------------------------------------------------------------------

Addendum: 10/11/19 at 1522 by Mihaela MARSHALL

-------------------------------------------------------------------------------

Amended: Links added.

## 2019-10-11 NOTE — NUR
PT REPORTS HE RECEIVED HIS MEDICATIONS FROM PHARMACY. PT EDUCATED ON WOUND CARE AND GIVEN: 
WOUND CLEANSER, 4X4'S, ABD PADS AND KERLIX FOR DRESSING CHANGES TO TAKE HOME.

## 2019-10-11 NOTE — NUR
WOUND CARE

WOUNDS CLEANSED WITH WOUND CLEANSER ON BILATERAL LEGS. PATTED DRY WITH 4X4'S. ABD PADS 
APPLIED WITH KERLIX WRAP. DC PHOTOS OF WOUNDS TAKEN.

## 2019-10-17 ENCOUNTER — HOSPITAL ENCOUNTER (EMERGENCY)
Dept: HOSPITAL 15 - ER | Age: 54
Discharge: HOME | End: 2019-10-17
Payer: MEDICAID

## 2019-10-17 VITALS — HEIGHT: 67 IN | BODY MASS INDEX: 20.4 KG/M2 | WEIGHT: 130 LBS

## 2019-10-17 VITALS — DIASTOLIC BLOOD PRESSURE: 60 MMHG | SYSTOLIC BLOOD PRESSURE: 99 MMHG

## 2019-10-17 DIAGNOSIS — Z87.891: ICD-10-CM

## 2019-10-17 DIAGNOSIS — F10.129: ICD-10-CM

## 2019-10-17 DIAGNOSIS — G40.909: Primary | ICD-10-CM

## 2019-10-17 LAB
ALBUMIN SERPL-MCNC: 3.4 G/DL (ref 3.4–5)
ALP SERPL-CCNC: 78 U/L (ref 45–117)
ALT SERPL-CCNC: 121 U/L (ref 16–61)
ANION GAP SERPL CALCULATED.3IONS-SCNC: 6 MMOL/L (ref 5–15)
BILIRUB SERPL-MCNC: 0.2 MG/DL (ref 0.2–1)
BUN SERPL-MCNC: 7 MG/DL (ref 7–18)
BUN/CREAT SERPL: 10.9
CALCIUM SERPL-MCNC: 8.8 MG/DL (ref 8.5–10.1)
CHLORIDE SERPL-SCNC: 111 MMOL/L (ref 98–107)
CO2 SERPL-SCNC: 28 MMOL/L (ref 21–32)
GLUCOSE SERPL-MCNC: 90 MG/DL (ref 74–106)
POTASSIUM SERPL-SCNC: 3.4 MMOL/L (ref 3.5–5.1)
PROT SERPL-MCNC: 7.7 G/DL (ref 6.4–8.2)
SODIUM SERPL-SCNC: 145 MMOL/L (ref 136–145)

## 2019-10-17 PROCEDURE — 80320 DRUG SCREEN QUANTALCOHOLS: CPT

## 2019-10-17 PROCEDURE — 82962 GLUCOSE BLOOD TEST: CPT

## 2019-10-17 PROCEDURE — 80053 COMPREHEN METABOLIC PANEL: CPT

## 2019-10-17 PROCEDURE — 94761 N-INVAS EAR/PLS OXIMETRY MLT: CPT

## 2019-10-17 PROCEDURE — 99283 EMERGENCY DEPT VISIT LOW MDM: CPT

## 2019-10-17 PROCEDURE — 36415 COLL VENOUS BLD VENIPUNCTURE: CPT

## 2019-10-17 PROCEDURE — 80185 ASSAY OF PHENYTOIN TOTAL: CPT
